# Patient Record
Sex: MALE | URBAN - METROPOLITAN AREA
[De-identification: names, ages, dates, MRNs, and addresses within clinical notes are randomized per-mention and may not be internally consistent; named-entity substitution may affect disease eponyms.]

---

## 2021-09-29 ENCOUNTER — APPOINTMENT (OUTPATIENT)
Dept: URBAN - METROPOLITAN AREA CLINIC 272 | Age: 21
Setting detail: DERMATOLOGY
End: 2021-10-01

## 2021-09-29 DIAGNOSIS — L64.8 OTHER ANDROGENIC ALOPECIA: ICD-10-CM

## 2021-09-29 PROCEDURE — OTHER ADDITIONAL NOTES: OTHER

## 2021-09-29 PROCEDURE — 99202 OFFICE O/P NEW SF 15 MIN: CPT

## 2021-09-29 PROCEDURE — OTHER COUNSELING: OTHER

## 2021-09-29 ASSESSMENT — LOCATION DETAILED DESCRIPTION DERM: LOCATION DETAILED: LEFT SUPERIOR PARIETAL SCALP

## 2021-09-29 ASSESSMENT — LOCATION SIMPLE DESCRIPTION DERM: LOCATION SIMPLE: SCALP

## 2021-09-29 ASSESSMENT — LOCATION ZONE DERM: LOCATION ZONE: SCALP

## 2021-09-29 NOTE — PROCEDURE: ADDITIONAL NOTES
Detail Level: Simple
Additional Notes: Patient is scheduled to follow up with endocrinologist on Nov 1st regarding low testosterone levels reported by PCP. No abnormal findings upon exam today.
Render Risk Assessment In Note?: no